# Patient Record
Sex: FEMALE | Race: WHITE
[De-identification: names, ages, dates, MRNs, and addresses within clinical notes are randomized per-mention and may not be internally consistent; named-entity substitution may affect disease eponyms.]

---

## 2017-01-19 ENCOUNTER — RX RENEWAL (OUTPATIENT)
Age: 52
End: 2017-01-19

## 2017-01-20 ENCOUNTER — RX RENEWAL (OUTPATIENT)
Age: 52
End: 2017-01-20

## 2017-01-25 ENCOUNTER — RX RENEWAL (OUTPATIENT)
Age: 52
End: 2017-01-25

## 2017-03-27 ENCOUNTER — RX RENEWAL (OUTPATIENT)
Age: 52
End: 2017-03-27

## 2017-06-16 ENCOUNTER — RX RENEWAL (OUTPATIENT)
Age: 52
End: 2017-06-16

## 2017-08-21 ENCOUNTER — APPOINTMENT (OUTPATIENT)
Dept: ENDOCRINOLOGY | Facility: CLINIC | Age: 52
End: 2017-08-21
Payer: MEDICAID

## 2017-08-21 VITALS
HEART RATE: 71 BPM | OXYGEN SATURATION: 98 % | HEIGHT: 63 IN | DIASTOLIC BLOOD PRESSURE: 68 MMHG | WEIGHT: 130 LBS | BODY MASS INDEX: 23.04 KG/M2 | SYSTOLIC BLOOD PRESSURE: 110 MMHG

## 2017-08-21 PROCEDURE — 99214 OFFICE O/P EST MOD 30 MIN: CPT

## 2017-08-22 ENCOUNTER — TRANSCRIPTION ENCOUNTER (OUTPATIENT)
Age: 52
End: 2017-08-22

## 2017-08-22 LAB
25(OH)D3 SERPL-MCNC: 41.2 NG/ML
ALBUMIN SERPL ELPH-MCNC: 5 G/DL
ALP BLD-CCNC: 48 U/L
ALT SERPL-CCNC: 14 U/L
ANION GAP SERPL CALC-SCNC: 14 MMOL/L
AST SERPL-CCNC: 14 U/L
BILIRUB SERPL-MCNC: 0.4 MG/DL
BUN SERPL-MCNC: 16 MG/DL
CALCIUM SERPL-MCNC: 10.5 MG/DL
CALCIUM SERPL-MCNC: 10.5 MG/DL
CHLORIDE SERPL-SCNC: 102 MMOL/L
CO2 SERPL-SCNC: 27 MMOL/L
CREAT SERPL-MCNC: 0.84 MG/DL
GLUCOSE SERPL-MCNC: 87 MG/DL
PARATHYROID HORMONE INTACT: 39 PG/ML
POTASSIUM SERPL-SCNC: 5 MMOL/L
PROT SERPL-MCNC: 7.5 G/DL
SODIUM SERPL-SCNC: 143 MMOL/L

## 2017-09-20 ENCOUNTER — RX RENEWAL (OUTPATIENT)
Age: 52
End: 2017-09-20

## 2017-12-06 ENCOUNTER — APPOINTMENT (OUTPATIENT)
Dept: ENDOCRINOLOGY | Facility: CLINIC | Age: 52
End: 2017-12-06
Payer: MEDICAID

## 2017-12-06 VITALS
HEIGHT: 63.5 IN | HEART RATE: 63 BPM | SYSTOLIC BLOOD PRESSURE: 110 MMHG | WEIGHT: 122 LBS | OXYGEN SATURATION: 98 % | DIASTOLIC BLOOD PRESSURE: 60 MMHG | BODY MASS INDEX: 21.35 KG/M2

## 2017-12-06 VITALS — WEIGHT: 123 LBS | BODY MASS INDEX: 21.52 KG/M2 | HEIGHT: 63.5 IN

## 2017-12-06 PROCEDURE — ZZZZZ: CPT

## 2017-12-06 PROCEDURE — 77080 DXA BONE DENSITY AXIAL: CPT

## 2017-12-06 PROCEDURE — 99214 OFFICE O/P EST MOD 30 MIN: CPT | Mod: 25

## 2017-12-07 LAB
25(OH)D3 SERPL-MCNC: 58.9 NG/ML
ALBUMIN SERPL ELPH-MCNC: 5 G/DL
ALP BLD-CCNC: 40 U/L
ALT SERPL-CCNC: 16 U/L
ANION GAP SERPL CALC-SCNC: 12 MMOL/L
AST SERPL-CCNC: 21 U/L
BILIRUB SERPL-MCNC: 0.4 MG/DL
BUN SERPL-MCNC: 13 MG/DL
CALCIUM SERPL-MCNC: 9.8 MG/DL
CHLORIDE SERPL-SCNC: 104 MMOL/L
CO2 SERPL-SCNC: 26 MMOL/L
CREAT SERPL-MCNC: 0.81 MG/DL
GLUCOSE SERPL-MCNC: 83 MG/DL
POTASSIUM SERPL-SCNC: 4.2 MMOL/L
PROT SERPL-MCNC: 7.3 G/DL
SODIUM SERPL-SCNC: 142 MMOL/L

## 2017-12-08 LAB
CALCIUM SERPL-MCNC: 9.8 MG/DL
PARATHYROID HORMONE INTACT: 38 PG/ML

## 2017-12-14 ENCOUNTER — TRANSCRIPTION ENCOUNTER (OUTPATIENT)
Age: 52
End: 2017-12-14

## 2020-02-04 ENCOUNTER — TRANSCRIPTION ENCOUNTER (OUTPATIENT)
Age: 55
End: 2020-02-04

## 2020-03-05 ENCOUNTER — APPOINTMENT (OUTPATIENT)
Dept: ENDOCRINOLOGY | Facility: CLINIC | Age: 55
End: 2020-03-05
Payer: MEDICAID

## 2020-03-05 VITALS — BODY MASS INDEX: 21.78 KG/M2 | HEIGHT: 63.9 IN | WEIGHT: 126 LBS

## 2020-03-05 VITALS — DIASTOLIC BLOOD PRESSURE: 70 MMHG | HEART RATE: 71 BPM | SYSTOLIC BLOOD PRESSURE: 100 MMHG | OXYGEN SATURATION: 98 %

## 2020-03-05 PROCEDURE — ZZZZZ: CPT

## 2020-03-05 PROCEDURE — 77080 DXA BONE DENSITY AXIAL: CPT

## 2020-03-05 PROCEDURE — 99213 OFFICE O/P EST LOW 20 MIN: CPT | Mod: 25

## 2020-03-05 RX ORDER — ERGOCALCIFEROL 1.25 MG/1
1.25 MG CAPSULE, LIQUID FILLED ORAL
Qty: 13 | Refills: 0 | Status: DISCONTINUED | COMMUNITY
Start: 2017-01-20 | End: 2020-03-05

## 2020-03-05 RX ORDER — ALENDRONATE SODIUM 70 MG/1
70 TABLET ORAL
Qty: 12 | Refills: 3 | Status: DISCONTINUED | COMMUNITY
Start: 2017-01-25 | End: 2020-03-05

## 2020-03-06 NOTE — ASSESSMENT
[Bisphosphonate Therapy] : Risks  and benefits of bisphosphonate therapy were  discussed with the patient including gastroesophageal irritation, osteonecrosis of the jaw, and atypical femur fractures, and acute phase reaction [FreeTextEntry1] : 54 year-old female with osteoporosis\par \par Routine bone density testing reported spine -2.4, femoral neck -1.6, total hip -1.3. I reviewed images suggested L3 and L4 are mildly arthritic. Using L1-L2 spine bone density appears to be -2.8. The patient has suffered a left ankle fracture in the past. She has no other unusual risk factors for osteoporosis. Began alendronate Nov 2016. Took correctly, tolerated well. No interval fx, no UGI sx, no thigh pain. No aches & pains. No heartburn. Last DDS within past 6 months. No ONJ. Pt d/c rx in 2018. BMD 3/2020 indicates worsened spine now osteoporosis, stable osteopenia in hip, and normal but worsened proximal radius. BMD results reviewed w/ pt.\par \par Vitamin D insufficiency. . .  Continue Vitamin D 2500 UI daily.\par \par Options for medical therapy w/ bisphosphonate therapy discussed in detail. Pt understands and elects monthly Actonel. Rx information handout provided. Medications instructions reviewed. Prescription sent out. If Actonel is not covered by insurance she can restart Fosamax.\par \par Labs reviewed: Ca 10.0, normal.\par \par f/u in 1 year w/ repeat BMD

## 2020-03-06 NOTE — END OF VISIT
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  03/05/2020.  3:00PM.

## 2020-03-06 NOTE — HISTORY OF PRESENT ILLNESS
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 At some point she took oral birth control pills. She was treated with Cyproterone, an anti-androgen not available in the [place]. She had laser therapy to the jaw line with permanent relief of hirsutism. Routine bone density testing reported spine -2.4, femoral neck -1.6, total hip -1.3. I reviewed images suggested L3 and L4 are mildly arthritic. Using L1-L2 spine bone density appears to be -2.8. The patient has suffered a left ankle fracture in the past. She has no other unusual risk factors for osteoporosis. Began alendronate Nov 2016. Took correctly, tolerated well. No interval fx, no UGI sx, no thigh pain. No aches & pains. No heartburn. Last DDS within past 6 months. No ONJ. Pt d/c rx in 2018. Pt takes 2500 UI Vitamin D daily. [Disordered Eating] : no past or present history of disordered eating [Kidney Stones] : no history of kidney stones [Family History of Osteoporosis] : no family history of osteoporosis [Family History of Breast Cancer] : no family history of breast cancer [Family History of Hip Fracture] : no family history of hip fracture [Hyperparathyroidism] : no hyperparathyroidism [History of Blood Clots] : no history of blood clots [de-identified] : ankle fx

## 2020-03-06 NOTE — PROCEDURE
[FreeTextEntry1] : Bone mineral density: 03/05/2020 \par Indication: vs. 2017\par Spine: -2.6 osteoporosis, -3.9%\par Total hip: -1.1 osteopenia, no significant change\par Femoral neck: -1.7 osteopenia, no significant change\par Proximal radius: -0.8 normal, -3.6%\par \par Bone mineral density 12/6/17\par indication: assess response to medication \par spine-2.3 osteopenia prior outside study 2016 -24, but L1-2 were -2.8\par total hip -1.2 osteopenia, no significant change \par femoral neck -1.8 osteopenia, no significant change \par proximal radius -0.4 normal

## 2020-04-22 ENCOUNTER — APPOINTMENT (OUTPATIENT)
Dept: OBGYN | Facility: CLINIC | Age: 55
End: 2020-04-22

## 2020-07-02 ENCOUNTER — APPOINTMENT (OUTPATIENT)
Dept: OBGYN | Facility: CLINIC | Age: 55
End: 2020-07-02
Payer: COMMERCIAL

## 2020-07-02 VITALS
BODY MASS INDEX: 21.97 KG/M2 | HEIGHT: 63 IN | DIASTOLIC BLOOD PRESSURE: 64 MMHG | SYSTOLIC BLOOD PRESSURE: 120 MMHG | TEMPERATURE: 99 F | WEIGHT: 124 LBS

## 2020-07-02 DIAGNOSIS — Z86.19 PERSONAL HISTORY OF OTHER INFECTIOUS AND PARASITIC DISEASES: ICD-10-CM

## 2020-07-02 DIAGNOSIS — Z78.9 OTHER SPECIFIED HEALTH STATUS: ICD-10-CM

## 2020-07-02 DIAGNOSIS — Z80.8 FAMILY HISTORY OF MALIGNANT NEOPLASM OF OTHER ORGANS OR SYSTEMS: ICD-10-CM

## 2020-07-02 DIAGNOSIS — Z12.31 ENCOUNTER FOR SCREENING MAMMOGRAM FOR MALIGNANT NEOPLASM OF BREAST: ICD-10-CM

## 2020-07-02 DIAGNOSIS — R92.2 INCONCLUSIVE MAMMOGRAM: ICD-10-CM

## 2020-07-02 PROCEDURE — 99386 PREV VISIT NEW AGE 40-64: CPT

## 2020-07-05 LAB — HPV HIGH+LOW RISK DNA PNL CVX: NOT DETECTED

## 2020-07-13 LAB — CYTOLOGY CVX/VAG DOC THIN PREP: ABNORMAL

## 2020-07-14 ENCOUNTER — TRANSCRIPTION ENCOUNTER (OUTPATIENT)
Age: 55
End: 2020-07-14

## 2020-07-15 ENCOUNTER — TRANSCRIPTION ENCOUNTER (OUTPATIENT)
Age: 55
End: 2020-07-15

## 2020-07-31 ENCOUNTER — TRANSCRIPTION ENCOUNTER (OUTPATIENT)
Age: 55
End: 2020-07-31

## 2020-08-17 ENCOUNTER — TRANSCRIPTION ENCOUNTER (OUTPATIENT)
Age: 55
End: 2020-08-17

## 2020-08-18 ENCOUNTER — TRANSCRIPTION ENCOUNTER (OUTPATIENT)
Age: 55
End: 2020-08-18

## 2020-08-19 ENCOUNTER — TRANSCRIPTION ENCOUNTER (OUTPATIENT)
Age: 55
End: 2020-08-19

## 2020-09-10 ENCOUNTER — TRANSCRIPTION ENCOUNTER (OUTPATIENT)
Age: 55
End: 2020-09-10

## 2020-09-14 ENCOUNTER — TRANSCRIPTION ENCOUNTER (OUTPATIENT)
Age: 55
End: 2020-09-14

## 2020-09-24 ENCOUNTER — RX RENEWAL (OUTPATIENT)
Age: 55
End: 2020-09-24

## 2020-09-24 ENCOUNTER — TRANSCRIPTION ENCOUNTER (OUTPATIENT)
Age: 55
End: 2020-09-24

## 2020-09-25 ENCOUNTER — TRANSCRIPTION ENCOUNTER (OUTPATIENT)
Age: 55
End: 2020-09-25

## 2020-09-28 ENCOUNTER — TRANSCRIPTION ENCOUNTER (OUTPATIENT)
Age: 55
End: 2020-09-28

## 2020-11-09 ENCOUNTER — TRANSCRIPTION ENCOUNTER (OUTPATIENT)
Age: 55
End: 2020-11-09

## 2021-03-09 ENCOUNTER — APPOINTMENT (OUTPATIENT)
Dept: ENDOCRINOLOGY | Facility: CLINIC | Age: 56
End: 2021-03-09
Payer: COMMERCIAL

## 2021-03-09 VITALS
HEART RATE: 82 BPM | OXYGEN SATURATION: 98 % | TEMPERATURE: 97 F | WEIGHT: 126 LBS | HEIGHT: 63.25 IN | SYSTOLIC BLOOD PRESSURE: 102 MMHG | BODY MASS INDEX: 22.05 KG/M2 | DIASTOLIC BLOOD PRESSURE: 72 MMHG

## 2021-03-09 PROCEDURE — ZZZZZ: CPT

## 2021-03-09 PROCEDURE — 77080 DXA BONE DENSITY AXIAL: CPT

## 2021-03-09 PROCEDURE — 99214 OFFICE O/P EST MOD 30 MIN: CPT | Mod: 25

## 2021-03-09 NOTE — PROCEDURE
[FreeTextEntry1] : Bone mineral density: 03/09/2021 \par Indication: vs. 2020 assess response to medication \par Spine: -2.2 osteopenia, +6.4%\par Total hip: -1.0 normal, no significant change\par Femoral neck: -1.5 osteopenia, no significant change\par Proximal radius: -0.8 normal, no significant change\par \par Bone mineral density: 03/05/2020 \par Indication: vs. 2017\par Spine: -2.6 osteoporosis, -3.9%\par Total hip: -1.1 osteopenia, no significant change\par Femoral neck: -1.7 osteopenia, no significant change\par Proximal radius: -0.8 normal, -3.6%\par \par Bone mineral density 12/6/17\par indication: assess response to medication \par spine-2.3 osteopenia prior outside study 2016 -24, but L1-2 were -2.8\par total hip -1.2 osteopenia, no significant change \par femoral neck -1.8 osteopenia, no significant change \par proximal radius -0.4 normal

## 2021-03-09 NOTE — ASSESSMENT
[Bisphosphonate Therapy] : Risks and benefits of bisphosphonate therapy were  discussed with the patient including gastroesophageal irritation, osteonecrosis of the jaw, and atypical femur fractures, and acute phase reaction [Bisphosphonates] : The patient was instructed to take bisphosphonates on an empty stomach with a full glass of water,and wait at least 30 minutes before eating or lying down [FreeTextEntry1] : 55 year-old female with osteoporosis\par \par Routine bone density testing reported spine -2.4, femoral neck -1.6, total hip -1.3. I reviewed images suggested L3 and L4 are mildly arthritic. Using L1-L2 spine bone density appears to be -2.8. The patient has suffered a left ankle fracture in the past. She has no other unusual risk factors for osteoporosis. Began alendronate Nov 2016. Took correctly, tolerated well. Pt d/c rx in 2018. BMD 3/2020 indicates worsened spine now osteoporosis, stable osteopenia in hip, and normal but worsened proximal radius. Options of therapy were reviewed. Pt elected to start Actonel 3/2020. Taking correctly, tolerating well. No interval fx, no UGI sx, no thigh pain. No aches & pains. No heartburn. No ONJ. BMD 3/2021 indicates improving osteopenia in spine, normal stable total hip and proximal radius, and stable osteopenia in femoral neck. BMD results reviewed w/ pt. C/w Actonel.\par \par I discussed that pt will likely be a candidate for a future drug holiday for < 5 years to decrease risk of long term therapy side effects; ONJ or atypical femur fx.\par \par Vitamin D insufficiency. Continue Vitamin D 2500 UI daily.\par \par Labs reviewed: Ca 9.8, normal. Vitamin D 40, normal. \par \par F/u in 1 year

## 2021-03-09 NOTE — PAST MEDICAL HISTORY
[Menopause Age____] : age at menopause was [unfilled] [History of Hormone Replacement Treatment] : has no history of hormone replacement treatment [de-identified] : nocturnal hot flashes

## 2021-03-09 NOTE — END OF VISIT
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  03/09/2021. 10:45AM. This note was authored by the medical scribe for me. I have reviewed, edited, and revised the note as needed. I am in agreement with the exam findings, imaging findings, and treatment plan.  Rodolfo Chamberlian MD

## 2021-03-09 NOTE — HISTORY OF PRESENT ILLNESS
[Alendronate (Fosomax)] : Alendronate [Risedronate (Actonel)] : Risedronate [Taking Steroids] : a history of taking steroids [Regular Dental Follow-Up] : regular dental follow-up [Previous Fragility Fracture] : previous fragility fracture(s) [FreeTextEntry1] : No significant health change. No interval surgeries, fractures, health change, or change in medications.\par \par The patient had been treated for hirsutism and polycystic ovarian syndrome with multiple different regimens the past. At some point she took oral birth control pills. She was treated with Cyproterone, an anti-androgen not available in the [place]. She had laser therapy to the jaw line with permanent relief of hirsutism.\par  Routine bone density testing reported spine -2.4, femoral neck -1.6, total hip -1.3. I reviewed images suggested L3 and L4 are mildly arthritic. Using L1-L2 spine bone density appears to be -2.8. The patient has suffered a left ankle fracture in the past. She has no other unusual risk factors for osteoporosis. Began alendronate Nov 2016. Took correctly, tolerated well. No interval fx, no UGI sx, no thigh pain. No aches & pains. No heartburn. Last DDS within past 6 months. No ONJ. Pt d/c rx in 2018. Pt takes 2500 UI Vitamin D daily. BMD 3/2020 indicates worsened spine now osteoporosis, stable osteopenia in hip, and normal but worsened proximal radius.\par  Pt elected to start Actonel 3/2020. Taking correctly, tolerating well. No interval fx, no UGI sx, no thigh pain. No aches & pains. No heartburn. Last DDS within past 6 months. No ONJ.  [Disordered Eating] : no past or present history of disordered eating [Kidney Stones] : no history of kidney stones [Family History of Osteoporosis] : no family history of osteoporosis [Family History of Breast Cancer] : no family history of breast cancer [Family History of Hip Fracture] : no family history of hip fracture [Hyperparathyroidism] : no hyperparathyroidism [History of Blood Clots] : no history of blood clots [de-identified] : ankle fx

## 2021-03-15 ENCOUNTER — TRANSCRIPTION ENCOUNTER (OUTPATIENT)
Age: 56
End: 2021-03-15

## 2021-03-16 ENCOUNTER — TRANSCRIPTION ENCOUNTER (OUTPATIENT)
Age: 56
End: 2021-03-16

## 2021-04-05 ENCOUNTER — TRANSCRIPTION ENCOUNTER (OUTPATIENT)
Age: 56
End: 2021-04-05

## 2021-07-07 ENCOUNTER — APPOINTMENT (OUTPATIENT)
Dept: OBGYN | Facility: CLINIC | Age: 56
End: 2021-07-07
Payer: COMMERCIAL

## 2021-07-07 ENCOUNTER — NON-APPOINTMENT (OUTPATIENT)
Age: 56
End: 2021-07-07

## 2021-07-07 VITALS
WEIGHT: 128 LBS | SYSTOLIC BLOOD PRESSURE: 102 MMHG | DIASTOLIC BLOOD PRESSURE: 70 MMHG | HEIGHT: 63.25 IN | BODY MASS INDEX: 22.4 KG/M2

## 2021-07-07 PROCEDURE — 99396 PREV VISIT EST AGE 40-64: CPT

## 2021-07-07 NOTE — HISTORY OF PRESENT ILLNESS
[FreeTextEntry1] : 56yo  postmenopausal without HRT here for annual Gyn exam. Pt has h/o PCOS managed with OCPs(Nicole) and Spironolactone for many years. She had an uneventful menopause at age 47 and never took HRT. She is being treated for osteoporosis with bisphosphonate (first treated with Alendronate weekly x 1.5 years now on monthly Risendronate). Her most recent BMD shows improvement. She is not currently sexually active but is interested in obtaining Gardasil vaccination in case she becomes active in the future( a good idea!)  [Mammogramdate] : 9/22/20 [TextBox_19] : [facility] BIRADS 2D [BreastSonogramDate] : 9/22/20 [TextBox_25] : [facility] BIRADS 2 [PapSmeardate] : 7/2/20 [TextBox_31] : Neg/HPV Neg [BoneDensityDate] : 3/9/21 [TextBox_37] : T Score Spine -2.2 Hip -1.0 Fem Neck -1.5

## 2021-07-12 ENCOUNTER — TRANSCRIPTION ENCOUNTER (OUTPATIENT)
Age: 56
End: 2021-07-12

## 2021-07-12 DIAGNOSIS — Z23 ENCOUNTER FOR IMMUNIZATION: ICD-10-CM

## 2021-07-19 ENCOUNTER — TRANSCRIPTION ENCOUNTER (OUTPATIENT)
Age: 56
End: 2021-07-19

## 2021-07-19 ENCOUNTER — RX RENEWAL (OUTPATIENT)
Age: 56
End: 2021-07-19

## 2021-07-23 ENCOUNTER — APPOINTMENT (OUTPATIENT)
Dept: OBGYN | Facility: CLINIC | Age: 56
End: 2021-07-23
Payer: COMMERCIAL

## 2021-07-23 PROCEDURE — 90651 9VHPV VACCINE 2/3 DOSE IM: CPT | Mod: NC

## 2021-07-23 PROCEDURE — 90471 IMMUNIZATION ADMIN: CPT

## 2021-08-31 ENCOUNTER — TRANSCRIPTION ENCOUNTER (OUTPATIENT)
Age: 56
End: 2021-08-31

## 2021-09-07 ENCOUNTER — TRANSCRIPTION ENCOUNTER (OUTPATIENT)
Age: 56
End: 2021-09-07

## 2021-09-13 ENCOUNTER — TRANSCRIPTION ENCOUNTER (OUTPATIENT)
Age: 56
End: 2021-09-13

## 2021-09-14 ENCOUNTER — TRANSCRIPTION ENCOUNTER (OUTPATIENT)
Age: 56
End: 2021-09-14

## 2021-09-15 ENCOUNTER — TRANSCRIPTION ENCOUNTER (OUTPATIENT)
Age: 56
End: 2021-09-15

## 2021-09-20 ENCOUNTER — APPOINTMENT (OUTPATIENT)
Dept: OBGYN | Facility: CLINIC | Age: 56
End: 2021-09-20
Payer: COMMERCIAL

## 2021-09-20 PROCEDURE — 90651 9VHPV VACCINE 2/3 DOSE IM: CPT

## 2021-09-20 PROCEDURE — 90471 IMMUNIZATION ADMIN: CPT

## 2021-11-19 ENCOUNTER — TRANSCRIPTION ENCOUNTER (OUTPATIENT)
Age: 56
End: 2021-11-19

## 2021-11-22 ENCOUNTER — TRANSCRIPTION ENCOUNTER (OUTPATIENT)
Age: 56
End: 2021-11-22

## 2022-03-01 ENCOUNTER — APPOINTMENT (OUTPATIENT)
Dept: OBGYN | Facility: CLINIC | Age: 57
End: 2022-03-01
Payer: COMMERCIAL

## 2022-03-01 PROCEDURE — 90651 9VHPV VACCINE 2/3 DOSE IM: CPT

## 2022-03-01 PROCEDURE — 90471 IMMUNIZATION ADMIN: CPT

## 2022-03-16 ENCOUNTER — APPOINTMENT (OUTPATIENT)
Dept: ENDOCRINOLOGY | Facility: CLINIC | Age: 57
End: 2022-03-16
Payer: COMMERCIAL

## 2022-03-16 VITALS
OXYGEN SATURATION: 98 % | WEIGHT: 123 LBS | DIASTOLIC BLOOD PRESSURE: 70 MMHG | BODY MASS INDEX: 22.35 KG/M2 | SYSTOLIC BLOOD PRESSURE: 106 MMHG | RESPIRATION RATE: 16 BRPM | HEART RATE: 84 BPM | HEIGHT: 62.3 IN | TEMPERATURE: 98.1 F

## 2022-03-16 DIAGNOSIS — E55.9 VITAMIN D DEFICIENCY, UNSPECIFIED: ICD-10-CM

## 2022-03-16 PROCEDURE — 99214 OFFICE O/P EST MOD 30 MIN: CPT | Mod: 25

## 2022-03-16 PROCEDURE — 77080 DXA BONE DENSITY AXIAL: CPT

## 2022-03-16 PROCEDURE — ZZZZZ: CPT

## 2022-03-16 NOTE — END OF VISIT
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  03/16/2022.  4:00PM. This note was authored by the medical scribe for me. I have reviewed, edited, and revised the note as needed. I am in agreement with the exam findings, imaging findings, and treatment plan.  Rodolfo Chamberlain MD

## 2022-03-16 NOTE — HISTORY OF PRESENT ILLNESS
[Alendronate (Fosomax)] : Alendronate [Risedronate (Actonel)] : Risedronate [FreeTextEntry1] : No significant interval health changes. 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 I reviewed images suggested L3 and L4 are mildly arthritic. Using L1-L2 spine bone density appears to be -2.8. The patient has suffered a left ankle fracture in the past. She has no other unusual risk factors for osteoporosis. Began Alendronate 11/2016. Took correctly, tolerated well. No interval fx, no UGI sx, no thigh pain. No aches & pains. No heartburn. Last DDS within past 6 months. No ONJ. Pt d/c rx in 2018. Pt takes 2500 IU Vitamin D daily. BMD 3/2020 indicates worsened spine now osteoporosis, stable osteopenia in hip, and normal but worsened proximal radius. Pt elected to start Actonel 3/2020. Taking correctly, tolerating well. No interval fx, no UGI sx, no thigh pain. No aches & pains. No heartburn. Last DDS within past 6 months. No ONJ. BMD 3/2021 indicates improving osteopenia in spine, normal stable total hip and proximal radius, and stable osteopenia in femoral neck.\par \par The patient had been treated for hirsutism and polycystic ovarian syndrome with multiple different regimens the past. At some point she took oral birth control pills. She was treated with Cyproterone, an anti-androgen not available in the [place]. She had laser therapy to the jaw line with permanent relief of hirsutism.

## 2022-03-16 NOTE — PROCEDURE
[FreeTextEntry1] : Bone mineral density: 03/16/2022 \par Indication: vs. 2021 assess response to medication\par Spine: -2.4 osteopenia, -3.3%, improved vs. baseline 2020\par Total hip: -1.0 normal, no significant change\par Femoral neck: -1.4 osteopenia, no significant change, +4.6% vs. baseline 2020\par Proximal radius: -0.9 normal, no significant change\par \par Bone mineral density: 03/09/2021 \par Indication: vs. 2020 assess response to medication \par Spine: -2.2 osteopenia, +6.4%\par Total hip: -1.0 normal, no significant change\par Femoral neck: -1.5 osteopenia, no significant change\par Proximal radius: -0.8 normal, no significant change\par \par Bone mineral density: 03/05/2020 \par Indication: vs. 2017\par Spine: -2.6 osteoporosis, -3.9%\par Total hip: -1.1 osteopenia, no significant change\par Femoral neck: -1.7 osteopenia, no significant change\par Proximal radius: -0.8 normal, -3.6%\par \par Bone mineral density 12/6/17\par indication: assess response to medication \par spine-2.3 osteopenia prior outside study 2016 -24, but L1-2 were -2.8\par total hip -1.2 osteopenia, no significant change \par femoral neck -1.8 osteopenia, no significant change \par proximal radius -0.4 normal

## 2022-03-16 NOTE — ASSESSMENT
[Bisphosphonate Therapy] : Risks and benefits of bisphosphonate therapy were  discussed with the patient including gastroesophageal irritation, osteonecrosis of the jaw, and atypical femur fractures, and acute phase reaction [Bisphosphonates] : The patient was instructed to take bisphosphonates on an empty stomach with a full glass of water,and wait at least 30 minutes before eating or lying down [FreeTextEntry1] : 56 year-old female with osteoporosis\par \par Routine bone density testing reported spine -2.4, femoral neck -1.6, total hip -1.3. I reviewed images suggested L3 and L4 are mildly arthritic. Using L1-L2 spine bone density appears to be -2.8. The patient has suffered a left ankle fracture in the past. She has no other unusual risk factors for osteoporosis. Began Alendronate 11/2016. Took correctly, tolerated well. Pt d/c rx in 2018. BMD 3/2020 indicates worsened spine now osteoporosis, stable osteopenia in hip, and normal but worsened proximal radius. Options of therapy were reviewed. Pt elected to start Actonel 3/2020. Taking correctly, tolerating well. No interval fx, no UGI sx, no thigh pain. No aches & pains. No heartburn. No ONJ. BMD 3/2021 indicates improving osteopenia in spine, normal stable total hip and proximal radius, and stable osteopenia in femoral neck. BMD 3/2022 indicates stable normal total hip and proximal radius, slightly worsened osteopenia in spine but suspect statistical regression to the mean, and stable osteopenia in femoral neck; spine and femoral neck improved vs 2020. BMD results reviewed w/ pt. Options discussed including c/w Actonel vs. beginning drug holiday. Pt elects to c/w Actonel.\par \par Raimundo SR, Roxana L, Kristie W, et al. Monitoring Osteoporosis Therapy With Bone Densitometry: Misleading Changes and Regression to the Mean. CORAL. 2000;283(10):1318–1321. doi:[email or web address]\par \par I discussed that weight bearing exercise, cardiovascular activities, and diet are beneficial to overall health, but are not adequate for bone density increase or alternative to osteoporosis medication.\par \par Vitamin D insufficiency. Continue Vitamin D 2500 IU daily.\par \par Labs 11/2021 reviewed: Ca 9.2, normal. Vitamin D 45.1, normal. TSH 3.3, normal. Creatinine 0.6, normal.\par \par F/u in 1 year w/ BMD

## 2022-03-16 NOTE — PAST MEDICAL HISTORY
[Menopause Age____] : age at menopause was [unfilled] [History of Hormone Replacement Treatment] : has no history of hormone replacement treatment [de-identified] : nocturnal hot flashes

## 2022-10-26 ENCOUNTER — NON-APPOINTMENT (OUTPATIENT)
Age: 57
End: 2022-10-26

## 2022-10-26 ENCOUNTER — APPOINTMENT (OUTPATIENT)
Dept: OBGYN | Facility: CLINIC | Age: 57
End: 2022-10-26

## 2022-10-26 VITALS
HEIGHT: 62 IN | WEIGHT: 129 LBS | SYSTOLIC BLOOD PRESSURE: 100 MMHG | DIASTOLIC BLOOD PRESSURE: 60 MMHG | BODY MASS INDEX: 23.74 KG/M2

## 2022-10-26 DIAGNOSIS — N95.2 POSTMENOPAUSAL ATROPHIC VAGINITIS: ICD-10-CM

## 2022-10-26 PROCEDURE — 99396 PREV VISIT EST AGE 40-64: CPT

## 2022-10-26 RX ORDER — HUMAN PAPILLOMAVIRUS 9-VALENT VACCINE, RECOMBINANT 30; 40; 60; 40; 20; 20; 20; 20; 20 UG/.5ML; UG/.5ML; UG/.5ML; UG/.5ML; UG/.5ML; UG/.5ML; UG/.5ML; UG/.5ML; UG/.5ML
INJECTION, SUSPENSION INTRAMUSCULAR
Qty: 1 | Refills: 2 | Status: COMPLETED | COMMUNITY
Start: 2021-07-12 | End: 2022-10-26

## 2022-10-26 RX ORDER — HUMAN PAPILLOMAVIRUS 9-VALENT VACCINE, RECOMBINANT 30; 40; 60; 40; 20; 20; 20; 20; 20 UG/.5ML; UG/.5ML; UG/.5ML; UG/.5ML; UG/.5ML; UG/.5ML; UG/.5ML; UG/.5ML; UG/.5ML
INJECTION, SUSPENSION INTRAMUSCULAR
Qty: 1 | Refills: 2 | Status: COMPLETED | COMMUNITY
Start: 2021-07-07 | End: 2022-10-26

## 2022-10-26 RX ORDER — HUMAN PAPILLOMAVIRUS 9-VALENT VACCINE, RECOMBINANT 30; 40; 60; 40; 20; 20; 20; 20; 20 UG/.5ML; UG/.5ML; UG/.5ML; UG/.5ML; UG/.5ML; UG/.5ML; UG/.5ML; UG/.5ML; UG/.5ML
INJECTION, SUSPENSION INTRAMUSCULAR
Qty: 1 | Refills: 0 | Status: COMPLETED | COMMUNITY
Start: 2022-02-24 | End: 2022-10-26

## 2022-10-26 NOTE — HISTORY OF PRESENT ILLNESS
[Patient reported colonoscopy was normal] : Patient reported colonoscopy was normal [FreeTextEntry1] : 57yo  postmenopausal without HRT here for annual Gyn exam. Pt has h/o PCOS managed with OCPs(Nicole) and Spironolactone for many years. She had an uneventful menopause at age 47 and never took HRT. She is being treated for osteoporosis with bisphosphonate (first treated with Alendronate weekly x 1.5 years now on monthly Risendronate). Her most recent BMD shows some decline but continued Risendronate recommendedt. She is not currently sexually active but is interested in vaginal estrogen use in case she becomes active in the future. She currently has no vaginal complaints nor does she have any vasomotor sx. She received Gardasil vaccination last year\par  [Mammogramdate] : 10/3/22 [TextBox_19] : [facility] LEIGHANN 1D [BreastSonogramDate] : 10/3/22 [TextBox_25] : [facility] BIRADS 1 [PapSmeardate] : 7/20/20 [TextBox_31] : Neg/HPV Neg [BoneDensityDate] : 3/16/22 [TextBox_37] : T Score Spine -2.4  Hip -1.0 Fem Neck -1.4 [ColonoscopyDate] : 2020 [TextBox_43] : [facility]

## 2022-10-26 NOTE — DISCUSSION/SUMMARY
[FreeTextEntry1] : Discussed various forms of local Estrogen for vaginal atrophy. Pt will research and call for Rx if contemplating beginning a sexual relationship.

## 2023-03-20 ENCOUNTER — RX RENEWAL (OUTPATIENT)
Age: 58
End: 2023-03-20

## 2023-07-03 ENCOUNTER — TRANSCRIPTION ENCOUNTER (OUTPATIENT)
Age: 58
End: 2023-07-03

## 2023-07-17 ENCOUNTER — APPOINTMENT (OUTPATIENT)
Dept: ENDOCRINOLOGY | Facility: CLINIC | Age: 58
End: 2023-07-17
Payer: COMMERCIAL

## 2023-07-17 VITALS — DIASTOLIC BLOOD PRESSURE: 60 MMHG | OXYGEN SATURATION: 98 % | HEART RATE: 80 BPM | SYSTOLIC BLOOD PRESSURE: 100 MMHG

## 2023-07-17 VITALS — WEIGHT: 128 LBS | BODY MASS INDEX: 22.4 KG/M2 | HEIGHT: 63.5 IN

## 2023-07-17 DIAGNOSIS — Z82.62 FAMILY HISTORY OF OSTEOPOROSIS: ICD-10-CM

## 2023-07-17 DIAGNOSIS — M81.0 AGE-RELATED OSTEOPOROSIS W/OUT CURRENT PATHOLOGICAL FRACTURE: ICD-10-CM

## 2023-07-17 PROCEDURE — 99213 OFFICE O/P EST LOW 20 MIN: CPT | Mod: 25

## 2023-07-17 PROCEDURE — ZZZZZ: CPT

## 2023-07-17 PROCEDURE — 77080 DXA BONE DENSITY AXIAL: CPT

## 2023-07-18 PROBLEM — Z82.62 FAMILY HISTORY OF OSTEOPOROSIS: Status: ACTIVE | Noted: 2023-07-18

## 2023-07-18 RX ORDER — RISEDRONATE SODIUM 150 MG/1
150 TABLET, FILM COATED ORAL
Qty: 3 | Refills: 3 | Status: DISCONTINUED | COMMUNITY
Start: 2020-03-05 | End: 2023-07-18

## 2023-07-18 NOTE — PAST MEDICAL HISTORY
[Menopause Age____] : age at menopause was [unfilled] [History of Hormone Replacement Treatment] : has no history of hormone replacement treatment [de-identified] : nocturnal hot flashes

## 2023-07-18 NOTE — ASSESSMENT
[Bisphosphonate Therapy] : Risks and benefits of bisphosphonate therapy were  discussed with the patient including gastroesophageal irritation, osteonecrosis of the jaw, and atypical femur fractures, and acute phase reaction [Bisphosphonates] : The patient was instructed to take bisphosphonates on an empty stomach with a full glass of water,and wait at least 30 minutes before eating or lying down [FreeTextEntry1] : 57  year-old female with osteoporosis\par \par Routine bone density testing reported spine -2.4, femoral neck -1.6, total hip -1.3. I reviewed images suggested L3 and L4 are mildly arthritic. Using L1-L2 spine bone density appears to be -2.8. The patient has suffered a left ankle fracture in the past. She has no other unusual risk factors for osteoporosis. \par Began Alendronate 11/2016. Took correctly, tolerated well. Pt d/c rx in 2018. BMD 3/2020 indicates worsened spine now osteoporosis, stable osteopenia in hip, and normal but worsened proximal radius. Options of therapy were reviewed. \par Pt elected to start Actonel 3/2020. Taking correctly, tolerating well. No interval fx, no UGI sx, no thigh pain. No aches & pains. No heartburn. No bone mineral density 2023 essentially stable, osteopenia in spine and normal to mild osteopenia hip and forearm. \par   Options discussed including c/w Actonel vs. beginning drug holiday.  \par The patient elects to begin drug holiday to reduce adverse effects. \par \par Vitamin D insufficiency. Continue Vitamin D 2500 IU daily.\par \par Terrence HA1, Violeta R1, Elida ML2, Taras CE2, Aleksandr KE1, Alisa JT3, Андрей VA2, Kaykay K1, Johanny M2, John CM2, Cinthia BC1, Miles M2, Richard TJ1.Long-Term Drug Therapy and Drug Discontinuations and Holidays for Osteoporosis Fracture Prevention: A Systematic Review [employer] [employer] [facility] 2019  \par  F/u in 1 year w/ BMD

## 2023-07-18 NOTE — PROCEDURE
[FreeTextEntry1] : \par Bone mineral density: 07/17/2023\par indication: Comparison to 2022 prior test showed rapid bone loss\par spine -2.4 osteopenia no significant change\par total hip -0.9 normal no significant change\par femoral neck -1.5 osteopenia no significant change\par proximal radius -0.4 normal +4.8%\par \par Bone mineral density: 03/16/2022 \par Indication: vs. 2021 assess response to medication\par Spine: -2.4 osteopenia, -3.3%, improved vs. baseline 2020\par Total hip: -1.0 normal, no significant change\par Femoral neck: -1.4 osteopenia, no significant change, +4.6% vs. baseline 2020\par Proximal radius: -0.9 normal, no significant change\par \par Bone mineral density: 03/09/2021 \par Indication: vs. 2020 assess response to medication \par Spine: -2.2 osteopenia, +6.4%\par Total hip: -1.0 normal, no significant change\par Femoral neck: -1.5 osteopenia, no significant change\par Proximal radius: -0.8 normal, no significant change\par \par Bone mineral density: 03/05/2020 \par Indication: vs. 2017\par Spine: -2.6 osteoporosis, -3.9%\par Total hip: -1.1 osteopenia, no significant change\par Femoral neck: -1.7 osteopenia, no significant change\par Proximal radius: -0.8 normal, -3.6%\par \par Bone mineral density 12/6/17\par indication: assess response to medication \par spine-2.3 osteopenia prior outside study 2016 -24, but L1-2 were -2.8\par total hip -1.2 osteopenia, no significant change \par femoral neck -1.8 osteopenia, no significant change \par proximal radius -0.4 normal

## 2023-11-08 ENCOUNTER — APPOINTMENT (OUTPATIENT)
Dept: OBGYN | Facility: CLINIC | Age: 58
End: 2023-11-08
Payer: COMMERCIAL

## 2023-11-08 VITALS
DIASTOLIC BLOOD PRESSURE: 78 MMHG | BODY MASS INDEX: 22.23 KG/M2 | SYSTOLIC BLOOD PRESSURE: 110 MMHG | HEIGHT: 63.5 IN | WEIGHT: 127 LBS

## 2023-11-08 DIAGNOSIS — Z01.419 ENCOUNTER FOR GYNECOLOGICAL EXAMINATION (GENERAL) (ROUTINE) W/OUT ABNORMAL FINDINGS: ICD-10-CM

## 2023-11-08 PROCEDURE — 99396 PREV VISIT EST AGE 40-64: CPT

## 2023-11-11 LAB — HPV HIGH+LOW RISK DNA PNL CVX: NOT DETECTED

## 2023-11-13 LAB — CYTOLOGY CVX/VAG DOC THIN PREP: ABNORMAL

## 2024-07-18 ENCOUNTER — TRANSCRIPTION ENCOUNTER (OUTPATIENT)
Age: 59
End: 2024-07-18

## 2024-07-22 ENCOUNTER — APPOINTMENT (OUTPATIENT)
Dept: ENDOCRINOLOGY | Facility: CLINIC | Age: 59
End: 2024-07-22

## 2024-12-04 ENCOUNTER — APPOINTMENT (OUTPATIENT)
Dept: OBGYN | Facility: CLINIC | Age: 59
End: 2024-12-04